# Patient Record
Sex: FEMALE | Race: WHITE | NOT HISPANIC OR LATINO | Employment: PART TIME | URBAN - METROPOLITAN AREA
[De-identification: names, ages, dates, MRNs, and addresses within clinical notes are randomized per-mention and may not be internally consistent; named-entity substitution may affect disease eponyms.]

---

## 2020-02-28 RX ORDER — LEVOTHYROXINE SODIUM 25 UG/1
TABLET ORAL
Qty: 90 TABLET | Refills: 0 | Status: SHIPPED | OUTPATIENT
Start: 2020-02-28 | End: 2020-07-24 | Stop reason: SDUPTHER

## 2020-06-09 ENCOUNTER — TELEPHONE (OUTPATIENT)
Dept: PRIMARY CARE CLINIC | Facility: CLINIC | Age: 70
End: 2020-06-09

## 2020-06-09 DIAGNOSIS — Z12.31 ENCOUNTER FOR SCREENING MAMMOGRAM FOR MALIGNANT NEOPLASM OF BREAST: Primary | ICD-10-CM

## 2020-06-09 NOTE — TELEPHONE ENCOUNTER
----- Message from Nereida Bone sent at 6/9/2020  9:01 AM CDT -----  Contact: Pt self Mobile [phone]  Patient would like a call back in regards to her wanting to speak with you about her wanting to get a mammogram done please. Patient said that she was one of your patient's but she never saw you here at [facility], patient have an appointment scheduled to see you on 07/23/2020 for a annual physical.

## 2020-06-18 ENCOUNTER — TELEPHONE (OUTPATIENT)
Dept: PRIMARY CARE CLINIC | Facility: CLINIC | Age: 70
End: 2020-06-18

## 2020-06-18 NOTE — TELEPHONE ENCOUNTER
----- Message from Elisa Lanier sent at 6/18/2020  1:30 PM CDT -----  Contact: Pt- [phone]  Type:  Patient Returning Call    Who Called: Pt    Who Left Message for Patient: Gladys Syed MA    Does the patient know what this is regarding?: yes    Would the patient rather a call back or a response via [facility]? Call    Best Call Back Number: [phone]

## 2020-06-18 NOTE — TELEPHONE ENCOUNTER
LVM requesting a call back to discuss mammogram results.    We rec pts results from [facility].   Impression: There is no mammographic evidence of malignancy. A 1 year screening mammogram is recommended.

## 2020-06-19 ENCOUNTER — PATIENT OUTREACH (OUTPATIENT)
Dept: ADMINISTRATIVE | Facility: HOSPITAL | Age: 70
End: 2020-06-19

## 2020-07-09 ENCOUNTER — TELEPHONE (OUTPATIENT)
Dept: PRIMARY CARE CLINIC | Facility: CLINIC | Age: 70
End: 2020-07-09

## 2020-07-09 NOTE — TELEPHONE ENCOUNTER
----- Message from Meenu Kalli sent at 7/9/2020 11:16 AM CDT -----  Contact: Pt [phone]  Patient is requesting to have her lab orders emailed to [email or web address]. She wants to bring the orders to have it done at [facility].    Please call and advise.    Thank You

## 2020-07-09 NOTE — TELEPHONE ENCOUNTER
I sw pt and informed her that  will order necessary labs at her visit. Pt will come in fasting and have labs done before leaving

## 2020-07-21 RX ORDER — POLYETHYLENE GLYCOL 3350, SODIUM SULFATE, SODIUM CHLORIDE, POTASSIUM CHLORIDE, ASCORBIC ACID, SODIUM ASCORBATE 140-9-5.2G
KIT ORAL DAILY
COMMUNITY
Start: 2020-07-01 | End: 2023-08-07

## 2020-07-22 RX ORDER — TACROLIMUS 1 MG/G
OINTMENT TOPICAL
COMMUNITY
Start: 2020-07-21 | End: 2023-08-07

## 2020-07-23 ENCOUNTER — LAB VISIT (OUTPATIENT)
Dept: LAB | Facility: HOSPITAL | Age: 70
End: 2020-07-23
Attending: INTERNAL MEDICINE
Payer: COMMERCIAL

## 2020-07-23 ENCOUNTER — OFFICE VISIT (OUTPATIENT)
Dept: PRIMARY CARE CLINIC | Facility: CLINIC | Age: 70
End: 2020-07-23
Payer: COMMERCIAL

## 2020-07-23 VITALS
OXYGEN SATURATION: 97 % | RESPIRATION RATE: 18 BRPM | TEMPERATURE: 97 F | WEIGHT: 211 LBS | HEART RATE: 80 BPM | HEIGHT: 68 IN | BODY MASS INDEX: 31.98 KG/M2 | DIASTOLIC BLOOD PRESSURE: 72 MMHG | SYSTOLIC BLOOD PRESSURE: 115 MMHG

## 2020-07-23 DIAGNOSIS — Z00.00 NORMAL PHYSICAL EXAM, ROUTINE: ICD-10-CM

## 2020-07-23 DIAGNOSIS — Z00.00 NORMAL PHYSICAL EXAM, ROUTINE: Primary | ICD-10-CM

## 2020-07-23 DIAGNOSIS — E55.9 VITAMIN D DEFICIENCY: ICD-10-CM

## 2020-07-23 DIAGNOSIS — E66.09 CLASS 1 OBESITY DUE TO EXCESS CALORIES WITH BODY MASS INDEX (BMI) OF 32.0 TO 32.9 IN ADULT, UNSPECIFIED WHETHER SERIOUS COMORBIDITY PRESENT: ICD-10-CM

## 2020-07-23 DIAGNOSIS — E03.9 HYPOTHYROIDISM, UNSPECIFIED TYPE: ICD-10-CM

## 2020-07-23 DIAGNOSIS — Z13.220 SCREENING FOR LIPOID DISORDERS: ICD-10-CM

## 2020-07-23 PROBLEM — E66.811 CLASS 1 OBESITY DUE TO EXCESS CALORIES WITH BODY MASS INDEX (BMI) OF 32.0 TO 32.9 IN ADULT: Status: ACTIVE | Noted: 2020-07-23

## 2020-07-23 LAB
BASOPHILS # BLD AUTO: 0.07 K/UL (ref 0–0.2)
BASOPHILS NFR BLD: 1 % (ref 0–1.9)
DIFFERENTIAL METHOD: ABNORMAL
EOSINOPHIL # BLD AUTO: 0.1 K/UL (ref 0–0.5)
EOSINOPHIL NFR BLD: 1.6 % (ref 0–8)
ERYTHROCYTE [DISTWIDTH] IN BLOOD BY AUTOMATED COUNT: 12.7 % (ref 11.5–14.5)
HCT VFR BLD AUTO: 46 % (ref 37–48.5)
HGB BLD-MCNC: 14.4 G/DL (ref 12–16)
IMM GRANULOCYTES # BLD AUTO: 0.01 K/UL (ref 0–0.04)
IMM GRANULOCYTES NFR BLD AUTO: 0.1 % (ref 0–0.5)
LYMPHOCYTES # BLD AUTO: 1.6 K/UL (ref 1–4.8)
LYMPHOCYTES NFR BLD: 23.5 % (ref 18–48)
MCH RBC QN AUTO: 30.3 PG (ref 27–31)
MCHC RBC AUTO-ENTMCNC: 31.3 G/DL (ref 32–36)
MCV RBC AUTO: 97 FL (ref 82–98)
MONOCYTES # BLD AUTO: 0.7 K/UL (ref 0.3–1)
MONOCYTES NFR BLD: 9.7 % (ref 4–15)
NEUTROPHILS # BLD AUTO: 4.5 K/UL (ref 1.8–7.7)
NEUTROPHILS NFR BLD: 64.1 % (ref 38–73)
NRBC BLD-RTO: 0 /100 WBC
PLATELET # BLD AUTO: 293 K/UL (ref 150–350)
PMV BLD AUTO: 10.7 FL (ref 9.2–12.9)
RBC # BLD AUTO: 4.76 M/UL (ref 4–5.4)
WBC # BLD AUTO: 6.98 K/UL (ref 3.9–12.7)

## 2020-07-23 PROCEDURE — 99999 PR PBB SHADOW E&M-EST. PATIENT-LVL IV: ICD-10-PCS | Mod: PBBFAC,,, | Performed by: INTERNAL MEDICINE

## 2020-07-23 PROCEDURE — 85025 COMPLETE CBC W/AUTO DIFF WBC: CPT

## 2020-07-23 PROCEDURE — 84443 ASSAY THYROID STIM HORMONE: CPT

## 2020-07-23 PROCEDURE — 84439 ASSAY OF FREE THYROXINE: CPT

## 2020-07-23 PROCEDURE — 36415 COLL VENOUS BLD VENIPUNCTURE: CPT | Mod: PN

## 2020-07-23 PROCEDURE — 99999 PR PBB SHADOW E&M-EST. PATIENT-LVL IV: CPT | Mod: PBBFAC,,, | Performed by: INTERNAL MEDICINE

## 2020-07-23 PROCEDURE — 80061 LIPID PANEL: CPT

## 2020-07-23 PROCEDURE — 80053 COMPREHEN METABOLIC PANEL: CPT

## 2020-07-23 PROCEDURE — 99397 PR PREVENTIVE VISIT,EST,65 & OVER: ICD-10-PCS | Mod: S$GLB,,, | Performed by: INTERNAL MEDICINE

## 2020-07-23 PROCEDURE — 99397 PER PM REEVAL EST PAT 65+ YR: CPT | Mod: S$GLB,,, | Performed by: INTERNAL MEDICINE

## 2020-07-23 RX ORDER — ACETAMINOPHEN 500 MG
1 TABLET ORAL DAILY
COMMUNITY
Start: 2018-09-19

## 2020-07-23 NOTE — PROGRESS NOTES
[facility] Note    Chief Complaint      Chief Complaint   Patient presents with    [facility]    Annual Exam       History of Present Illness      Karrie Ballard is a 69 y.o. WF with Hypothyroidism, Vit D def presents to re-est care with me and for well visit. Referred by nephew Dr. Job Aguirre.    Hypothyroidism - Pt on Levothyroxine 25 mcg/d    Obesity - BMI - 32.08 - Pt walking 3 miles 3 times/wk and tries to eat healthily.    HCM - Flu - '19;  Tdap - ?utd;  PCV 13 - ?;  PVX 23 - ?;  Shingrix - none - defers;  MGM - 20 - neg;  DEXA - > 3 yrs ago - defers;  PAP - at 67 y.o. - Dr. Gumaro Seals; C-scope - sched for 20;   Prev PCP - me at [facility]; GI - Dr. Eddy; Derm - Dr. Maki;  Ophtho - Dr. Pfeiffer;  Ortho - Dr. Yang/Oliver    Past Medical History:  Past Medical History:   Diagnosis Date    Cataract     Hypothyroidism     Vitamin D deficiency        Past Surgical History:   has a past surgical history that includes  section; Cataract extraction (Right); Repair of meniscus of knee (Right, 2018); Total knee arthroplasty (Right, 2019); and [place] tooth extraction.    Family History:  family history includes Aneurysm (age of onset: 77) in her father; Breast cancer (age of onset: 75) in her mother; Hearing loss in her father; Hypothyroidism in her mother; Macular degeneration in her mother; Thyroid nodules in her mother; Ulcerative colitis (age of onset: 40) in her son.     Social History:  Social History     Tobacco Use    Smoking status: Never Smoker    Smokeless tobacco: Never Used   Substance Use Topics    Alcohol use: Never     Frequency: Never    Drug use: Never       Review of Systems   Constitutional: Negative for chills, diaphoresis and fever.   HENT: Negative for congestion, hearing loss, sore throat and tinnitus.    Eyes: Negative for blurred vision and double vision.   Respiratory: Negative for cough and shortness of breath.    Cardiovascular:  Negative for chest pain and palpitations.   Gastrointestinal: Negative for abdominal pain, blood in stool, constipation, diarrhea, heartburn, melena, nausea and vomiting.   Genitourinary: Negative for dysuria and frequency.   Musculoskeletal: Negative for joint pain and myalgias.   Skin: Negative for itching and rash.   Neurological: Negative for dizziness and headaches.   Endo/Heme/Allergies: Does not bruise/bleed easily.   Psychiatric/Behavioral: Negative for depression. The patient is not nervous/anxious.         Medications:  Outpatient Encounter Medications as of 7/23/2020   Medication Sig Dispense Refill    cholecalciferol, vitamin D3, (VITAMIN D3) 50 mcg (2,000 unit) Cap Take 1 capsule by mouth once daily.       levothyroxine (SYNTHROID) 25 MCG tablet TAKE 1 TABLET BY MOUTH DAILY 90 tablet 0    naproxen sodium (ALEVE ORAL) Take by mouth as needed.       PLENVU 140-9-5.2 gram PPkS once daily.       tacrolimus (PROTOPIC) 0.1 % ointment       vit C/E/Zn/coppr/lutein/zeaxan (PRESERVISION AREDS-2 ORAL) Take by mouth 2 (two) times a day.       No facility-administered encounter medications on file as of 7/23/2020.        Current Outpatient Medications:     cholecalciferol, vitamin D3, (VITAMIN D3) 50 mcg (2,000 unit) Cap, Take 1 capsule by mouth once daily. , Disp: , Rfl:     levothyroxine (SYNTHROID) 25 MCG tablet, TAKE 1 TABLET BY MOUTH DAILY, Disp: 90 tablet, Rfl: 0    naproxen sodium (ALEVE ORAL), Take by mouth as needed. , Disp: , Rfl:     PLENVU 140-9-5.2 gram PPkS, once daily. , Disp: , Rfl:     tacrolimus (PROTOPIC) 0.1 % ointment, , Disp: , Rfl:     vit C/E/Zn/coppr/lutein/zeaxan (PRESERVISION AREDS-2 ORAL), Take by mouth 2 (two) times a day., Disp: , Rfl:     Allergies:  Review of patient's allergies indicates:   Allergen Reactions    Corticosteroids (glucocorticoids)      Other reaction(s): flushed face    Penicillins Hives       Health Maintenance:    There is no immunization history on  "file for this patient.   Health Maintenance   Topic Date Due    Hepatitis C Screening  1950    Lipid Panel  1950    TETANUS VACCINE  08/01/1970    DEXA SCAN  08/01/1992    Pneumococcal Vaccine (65+ Low/Medium Risk) (1 of 2 - PCV13) 08/01/2017    Mammogram  06/16/2022      Objective:      Vital Signs  Temp: 96.9 °F (36.1 °C)  Pulse: 80  Resp: 18  SpO2: 97 %  BP: 115/72  BP Location: Left arm  Patient Position: Sitting  Pain Score: 0-No pain  Height and Weight  Height: 5' 8" (172.7 cm)  Weight: 95.7 kg (210 lb 15.7 oz)  BSA (Calculated - sq m): 2.14 sq meters  BMI (Calculated): 32.1  Weight in (lb) to have BMI = 25: 164.1]    Laboratory:    Physical Exam  Vitals signs and nursing note reviewed.   Constitutional:       General: She is not in acute distress.     Appearance: She is well-developed. She is obese. She is not ill-appearing, toxic-appearing or diaphoretic.   HENT:      Head: Normocephalic and atraumatic.      Right Ear: External ear normal.      Left Ear: External ear normal.   Eyes:      Extraocular Movements: Extraocular movements intact.      Conjunctiva/sclera: Conjunctivae normal.      Pupils: Pupils are equal, round, and reactive to light.   Neck:      Musculoskeletal: Normal range of motion and neck supple.      Thyroid: No thyromegaly.      Vascular: No JVD.   Cardiovascular:      Rate and Rhythm: Normal rate and regular rhythm.      Heart sounds: No murmur. No friction rub. No gallop.    Pulmonary:      Effort: Pulmonary effort is normal. No respiratory distress.      Breath sounds: Normal breath sounds. No wheezing or rales.   Abdominal:      General: Bowel sounds are normal. There is no distension.      Palpations: Abdomen is soft. There is no mass.      Tenderness: There is no abdominal tenderness. There is no guarding or rebound.   Musculoskeletal: Normal range of motion.         General: No deformity.   Lymphadenopathy:      Cervical: No cervical adenopathy.   Skin:     " General: Skin is warm.      Findings: No erythema or rash.      Nails: There is no clubbing.     Neurological:      Mental Status: She is alert and oriented to person, place, and time.      Cranial Nerves: No cranial nerve deficit.   Psychiatric:         Mood and Affect: Mood normal.         Behavior: Behavior normal.             Assessment:       1. Normal physical exam, routine    2. Screening for lipoid disorders    3. Hypothyroidism, unspecified type    4. Vitamin D deficiency    5. Class 1 obesity due to excess calories with body mass index (BMI) of 32.0 to 32.9 in adult, unspecified whether serious comorbidity present        Karrie Ballard is a 69 y.o.female with:    1. Normal physical exam, routine  - CBC auto differential; Future  - Comprehensive metabolic panel; Future  - T4, free; Future  - TSH; Future  - Performed today.  Will check Basic labs.  RTC in 1 yr for fu or sooner if needed    2. Screening for lipoid disorders  - Lipid Panel; Future    3. Hypothyroidism, unspecified type  -Cont current.  Repeat TFT's.    4. Vitamin D deficiency  -Cont current.  Will get old records to review.    5. Class 1 obesity due to excess calories with body mass index (BMI) of 32.0 to 32.9 in adult, unspecified whether serious comorbidity present  -Cont to enc diet and exercise for wt loss.        Chronic conditions status updated as per HPI.  Other than changes above, cont current medications and maintain follow up with specialists.  Return to clinic 12 months for well visit or sooner if needed.      Olga Akbar MD  [facility]

## 2020-07-24 DIAGNOSIS — E03.9 HYPOTHYROIDISM, UNSPECIFIED TYPE: Primary | ICD-10-CM

## 2020-07-24 LAB
ALBUMIN SERPL BCP-MCNC: 4.4 G/DL (ref 3.5–5.2)
ALP SERPL-CCNC: 56 U/L (ref 55–135)
ALT SERPL W/O P-5'-P-CCNC: 16 U/L (ref 10–44)
ANION GAP SERPL CALC-SCNC: 12 MMOL/L (ref 8–16)
AST SERPL-CCNC: 19 U/L (ref 10–40)
BILIRUB SERPL-MCNC: 0.4 MG/DL (ref 0.1–1)
BUN SERPL-MCNC: 15 MG/DL (ref 8–23)
CALCIUM SERPL-MCNC: 9.4 MG/DL (ref 8.7–10.5)
CHLORIDE SERPL-SCNC: 107 MMOL/L (ref 95–110)
CHOLEST SERPL-MCNC: 176 MG/DL (ref 120–199)
CHOLEST/HDLC SERPL: 4.1 {RATIO} (ref 2–5)
CO2 SERPL-SCNC: 22 MMOL/L (ref 23–29)
CREAT SERPL-MCNC: 0.9 MG/DL (ref 0.5–1.4)
EST. GFR  (AFRICAN AMERICAN): >60 ML/MIN/1.73 M^2
EST. GFR  (NON AFRICAN AMERICAN): >60 ML/MIN/1.73 M^2
GLUCOSE SERPL-MCNC: 82 MG/DL (ref 70–110)
HDLC SERPL-MCNC: 43 MG/DL (ref 40–75)
HDLC SERPL: 24.4 % (ref 20–50)
LDLC SERPL CALC-MCNC: 88.8 MG/DL (ref 63–159)
NONHDLC SERPL-MCNC: 133 MG/DL
POTASSIUM SERPL-SCNC: 4.4 MMOL/L (ref 3.5–5.1)
PROT SERPL-MCNC: 7.8 G/DL (ref 6–8.4)
SODIUM SERPL-SCNC: 141 MMOL/L (ref 136–145)
T4 FREE SERPL-MCNC: 0.72 NG/DL (ref 0.71–1.51)
TRIGL SERPL-MCNC: 221 MG/DL (ref 30–150)
TSH SERPL DL<=0.005 MIU/L-ACNC: 2.26 UIU/ML (ref 0.4–4)

## 2020-07-24 RX ORDER — LEVOTHYROXINE SODIUM 25 UG/1
25 TABLET ORAL DAILY
Qty: 90 TABLET | Refills: 3 | Status: SHIPPED | OUTPATIENT
Start: 2020-07-24 | End: 2021-05-27 | Stop reason: SDUPTHER

## 2020-07-24 NOTE — PROGRESS NOTES
I sent pt a my chart message -  I reviewed your labs.  Your thyroid functions are normal.  Your Cholesterol looked good except for your Triglycerides being mildly elevated. I just recommend exercise and low chol diet.  Your kidney function and liver functions looked good.  You are not anemic. No further recommendations at this time.  I sent you refills on your thyroid meds to your pharm      Dr. TRENT

## 2020-07-31 ENCOUNTER — PATIENT OUTREACH (OUTPATIENT)
Dept: PRIMARY CARE CLINIC | Facility: CLINIC | Age: 70
End: 2020-07-31

## 2020-10-26 PROBLEM — Z00.00 NORMAL PHYSICAL EXAM, ROUTINE: Status: RESOLVED | Noted: 2020-07-23 | Resolved: 2020-10-26

## 2021-01-18 ENCOUNTER — PATIENT MESSAGE (OUTPATIENT)
Dept: PRIMARY CARE CLINIC | Facility: CLINIC | Age: 71
End: 2021-01-18

## 2021-05-27 ENCOUNTER — OFFICE VISIT (OUTPATIENT)
Dept: PRIMARY CARE CLINIC | Facility: CLINIC | Age: 71
End: 2021-05-27
Payer: COMMERCIAL

## 2021-05-27 VITALS
TEMPERATURE: 99 F | SYSTOLIC BLOOD PRESSURE: 122 MMHG | OXYGEN SATURATION: 97 % | RESPIRATION RATE: 16 BRPM | HEART RATE: 82 BPM | DIASTOLIC BLOOD PRESSURE: 80 MMHG | HEIGHT: 68 IN | BODY MASS INDEX: 32.18 KG/M2 | WEIGHT: 212.31 LBS

## 2021-05-27 DIAGNOSIS — Z01.818 PREOP EXAMINATION: Primary | ICD-10-CM

## 2021-05-27 DIAGNOSIS — E03.9 HYPOTHYROIDISM, UNSPECIFIED TYPE: ICD-10-CM

## 2021-05-27 DIAGNOSIS — Z12.31 SCREENING MAMMOGRAM, ENCOUNTER FOR: ICD-10-CM

## 2021-05-27 DIAGNOSIS — E78.1 HYPERTRIGLYCERIDEMIA: ICD-10-CM

## 2021-05-27 DIAGNOSIS — E55.9 VITAMIN D DEFICIENCY: ICD-10-CM

## 2021-05-27 PROCEDURE — 99999 PR PBB SHADOW E&M-EST. PATIENT-LVL IV: ICD-10-PCS | Mod: PBBFAC,,, | Performed by: INTERNAL MEDICINE

## 2021-05-27 PROCEDURE — 99999 PR PBB SHADOW E&M-EST. PATIENT-LVL IV: CPT | Mod: PBBFAC,,, | Performed by: INTERNAL MEDICINE

## 2021-05-27 PROCEDURE — 99214 OFFICE O/P EST MOD 30 MIN: CPT | Mod: S$GLB,,, | Performed by: INTERNAL MEDICINE

## 2021-05-27 PROCEDURE — 99214 PR OFFICE/OUTPT VISIT, EST, LEVL IV, 30-39 MIN: ICD-10-PCS | Mod: S$GLB,,, | Performed by: INTERNAL MEDICINE

## 2021-05-27 RX ORDER — LEVOTHYROXINE SODIUM 25 UG/1
25 TABLET ORAL DAILY
Qty: 90 TABLET | Refills: 0 | Status: SHIPPED | OUTPATIENT
Start: 2021-05-27 | End: 2021-10-14

## 2021-06-01 ENCOUNTER — TELEPHONE (OUTPATIENT)
Dept: PRIMARY CARE CLINIC | Facility: CLINIC | Age: 71
End: 2021-06-01

## 2021-07-07 ENCOUNTER — PATIENT MESSAGE (OUTPATIENT)
Dept: ADMINISTRATIVE | Facility: HOSPITAL | Age: 71
End: 2021-07-07

## 2021-07-21 ENCOUNTER — PATIENT MESSAGE (OUTPATIENT)
Dept: ADMINISTRATIVE | Facility: HOSPITAL | Age: 71
End: 2021-07-21

## 2021-07-21 ENCOUNTER — PATIENT OUTREACH (OUTPATIENT)
Dept: ADMINISTRATIVE | Facility: HOSPITAL | Age: 71
End: 2021-07-21

## 2021-07-23 ENCOUNTER — TELEPHONE (OUTPATIENT)
Dept: PRIMARY CARE CLINIC | Facility: CLINIC | Age: 71
End: 2021-07-23

## 2021-07-27 ENCOUNTER — LAB VISIT (OUTPATIENT)
Dept: LAB | Facility: HOSPITAL | Age: 71
End: 2021-07-27
Attending: INTERNAL MEDICINE
Payer: COMMERCIAL

## 2021-07-27 DIAGNOSIS — E78.1 HYPERTRIGLYCERIDEMIA: ICD-10-CM

## 2021-07-27 DIAGNOSIS — E03.9 HYPOTHYROIDISM, UNSPECIFIED TYPE: ICD-10-CM

## 2021-07-27 PROCEDURE — 84443 ASSAY THYROID STIM HORMONE: CPT | Performed by: INTERNAL MEDICINE

## 2021-07-27 PROCEDURE — 36415 COLL VENOUS BLD VENIPUNCTURE: CPT | Mod: PN | Performed by: INTERNAL MEDICINE

## 2021-07-27 PROCEDURE — 80061 LIPID PANEL: CPT | Performed by: INTERNAL MEDICINE

## 2021-07-28 LAB
CHOLEST SERPL-MCNC: 158 MG/DL (ref 120–199)
CHOLEST/HDLC SERPL: 3.5 {RATIO} (ref 2–5)
HDLC SERPL-MCNC: 45 MG/DL (ref 40–75)
HDLC SERPL: 28.5 % (ref 20–50)
LDLC SERPL CALC-MCNC: 76 MG/DL (ref 63–159)
NONHDLC SERPL-MCNC: 113 MG/DL
TRIGL SERPL-MCNC: 185 MG/DL (ref 30–150)
TSH SERPL DL<=0.005 MIU/L-ACNC: 3.46 UIU/ML (ref 0.4–4)

## 2022-01-12 DIAGNOSIS — E03.9 HYPOTHYROIDISM, UNSPECIFIED TYPE: ICD-10-CM

## 2022-01-13 NOTE — TELEPHONE ENCOUNTER
No new care gaps identified.  Powered by [employer]. Reference number: [id number].   1/12/2022 8:48:44 PM CST

## 2022-01-20 DIAGNOSIS — E03.9 HYPOTHYROIDISM, UNSPECIFIED TYPE: ICD-10-CM

## 2022-01-20 RX ORDER — LEVOTHYROXINE SODIUM 25 UG/1
25 TABLET ORAL DAILY
Qty: 90 TABLET | Refills: 0 | Status: SHIPPED | OUTPATIENT
Start: 2022-01-20 | End: 2022-04-16

## 2022-01-20 NOTE — TELEPHONE ENCOUNTER
----- Message from Warrennegra Atif sent at 1/20/2022 12:43 PM CST -----  Contact: self [phone]  Requesting an RX refill or new RX.  Is this a refill or new RX:new  RX name and strength (copy/paste from chart): levothyroxine (SYNTHROID) 25 MCG tablet   Is this a 30 day or 90 day RX:   Patient advised that in the future they can use their [facility] account to request a refill?: yes  Patient advised that RX refills can take up to 72 hours?yes  Pharmacy name and phone # (copy/paste from chart):    [id number] - [place], [place] - [address] AT [facility] [address]  [facility] [address]  Phone: [phone] Fax: [phone]      Comments:       Please call and advise

## 2022-01-20 NOTE — TELEPHONE ENCOUNTER
No new care gaps identified.  Powered by [employer]. Reference number: [id number].   1/20/2022 1:45:34 PM CST

## 2022-01-23 RX ORDER — LEVOTHYROXINE SODIUM 25 UG/1
TABLET ORAL
Qty: 90 TABLET | Refills: 0 | OUTPATIENT
Start: 2022-01-23

## 2022-01-23 NOTE — TELEPHONE ENCOUNTER
Quick DC. Request already responded to by other means (e.g. phone or fax)   Refill Authorization Note   Karrie Ballard  is requesting a refill authorization.  Brief Assessment and Rationale for Refill:  Quick Discontinue  Medication Therapy Plan:              Comments:   Pended Medication(s)       Requested Prescriptions     Pending Prescriptions Disp Refills    levothyroxine (SYNTHROID) 25 MCG tablet [Pharmacy Med Name: LEVOTHYROXINE 0.025MG (25MCG) TAB] 90 tablet 0     Sig: TAKE 1 TABLET(25 MCG) BY MOUTH EVERY DAY        Duplicate Pended Encounter(s)/ Last Prescribed Details: (includes pharmacy & prescriber details)   Providers    Authorizing Provider:    Karina Lyman MD   [address] [facility] [address]   Phone:  [phone]   Fax:  [phone]   MUNIRA #:  [id number]   NPI:  [id number]        Ordering User:  Karina Lyman MD          Pharmacy    [id number] [place], [place] - [facility] [address] [facility] [address]   [facility] [address] [facility] [address]   Phone:  [phone]  Fax:  [phone]   MUNIRA #:  [id number]   MARIUSZ Reason: --       Outpatient Medication Detail     Disp Refills Start End MARIUSZ   levothyroxine (SYNTHROID) 25 MCG tablet 90 tablet 0 1/20/2022  No   Sig - Route: Take 1 tablet (25 mcg total) by mouth once daily. - Oral   Sent to pharmacy as: levothyroxine (SYNTHROID) 25 MCG tablet   Class: Normal   Order: [id number]   Date/Time Signed: 1/20/2022 15:04       E-Prescribing Status: Receipt confirmed by pharmacy (1/20/2022  3:04 PM CST)     Ordering Encounter Report    Associated Reports   View Encounter                Note composed:7:45 AM 01/23/2022

## 2022-06-06 ENCOUNTER — TELEPHONE (OUTPATIENT)
Dept: PRIMARY CARE CLINIC | Facility: CLINIC | Age: 72
End: 2022-06-06
Payer: COMMERCIAL

## 2022-06-06 DIAGNOSIS — Z12.31 SCREENING MAMMOGRAM, ENCOUNTER FOR: Primary | ICD-10-CM

## 2022-06-06 NOTE — TELEPHONE ENCOUNTER
----- Message from Beatris York sent at 6/6/2022  8:53 AM CDT -----  Contact: Karrie   Karrie would like orders placed to have a MAMMO

## 2022-07-11 DIAGNOSIS — E03.9 HYPOTHYROIDISM, UNSPECIFIED TYPE: ICD-10-CM

## 2022-07-11 RX ORDER — LEVOTHYROXINE SODIUM 25 UG/1
TABLET ORAL
Qty: 90 TABLET | Refills: 0 | Status: SHIPPED | OUTPATIENT
Start: 2022-07-11 | End: 2022-10-07

## 2022-07-11 NOTE — TELEPHONE ENCOUNTER
No new care gaps identified.  [facility] Embedded Care Gaps. Reference number: [id number]. 7/11/2022   5:51:09 AM DENZELT

## 2022-07-12 NOTE — TELEPHONE ENCOUNTER
Refill Routing Note   Medication(s) are not appropriate for processing by [facility] for the following reason(s):      Drug-Disease: levothyroxine and Hypertriglyceridemia    [facility] action(s):  Defer Medication-related problems identified: Drug-disease interaction     Medication Therapy Plan: Drug-Disease: levothyroxine and Hypertriglyceridemia  Medication reconciliation completed: No     Appointments  past 12m or future 3m with PCP    Date Provider   Last Visit   5/27/2021 Olga Akbar MD   Next Visit   Visit date not found Olga Akbar MD   ED visits in past 90 days: 0        Note composed:10:28 PM 07/11/2022

## 2022-07-21 LAB — BCS RECOMMENDATION EXT: NORMAL

## 2022-07-26 ENCOUNTER — PATIENT OUTREACH (OUTPATIENT)
Dept: ADMINISTRATIVE | Facility: HOSPITAL | Age: 72
End: 2022-07-26
Payer: COMMERCIAL

## 2022-07-26 ENCOUNTER — TELEPHONE (OUTPATIENT)
Dept: PRIMARY CARE CLINIC | Facility: CLINIC | Age: 72
End: 2022-07-26
Payer: COMMERCIAL

## 2022-07-26 NOTE — PROGRESS NOTES
Patient due for the following    Hepatitis C Screening     Pneumococcal Vaccines (Age 65+) (1 - PCV)    TETANUS VACCINE     COVID-19 Vaccine (3 - Booster for Moderna series)    DEXA Scan       Received mammogram records.  Scanned to media.  updated    Immunizations: reviewed and updated  Care Everywhere: triggered  Care Teams: up to date  Outreach: none needed

## 2023-03-31 NOTE — PROGRESS NOTES
Received mammogram results.  Scanned to media.   updated.  
signs of injury. Right Ear: Tympanic membrane, ear canal and external ear normal.      Left Ear: Tympanic membrane, ear canal and external ear normal.      Mouth/Throat:      Mouth: Mucous membranes are moist.      Pharynx: Posterior oropharyngeal erythema present. No oropharyngeal exudate. Tonsils: No tonsillar exudate. Eyes:      Pupils: Pupils are equal, round, and reactive to light. Cardiovascular:      Rate and Rhythm: Normal rate and regular rhythm. Heart sounds: S1 normal and S2 normal. No murmur heard. Pulmonary:      Effort: Pulmonary effort is normal.      Breath sounds: Normal breath sounds. Abdominal:      General: Bowel sounds are normal. There is no distension. Palpations: Abdomen is soft. There is no mass. Tenderness: There is no abdominal tenderness. Musculoskeletal:         General: No deformity. Normal range of motion. Cervical back: Normal range of motion. Comments: Spine without noticeable abnormal curvature   Skin:     General: Skin is warm and dry. Coloration: Skin is not pale. Findings: No rash. Neurological:      Mental Status: She is alert. Cranial Nerves: No cranial nerve deficit. Psychiatric:         Mood and Affect: Mood normal.         Thought Content: Thought content normal.             Documentation was done using voice recognition dragon software. Efforts were made to ensure accuracy; however, inadvertent, unintentional computerized transcription errors may be present. --Sonal Giles MD on 3/31/2023    An electronic signature was used to authenticate this note.

## 2023-06-21 ENCOUNTER — TELEPHONE (OUTPATIENT)
Dept: PRIMARY CARE CLINIC | Facility: CLINIC | Age: 73
End: 2023-06-21
Payer: COMMERCIAL

## 2023-06-21 DIAGNOSIS — Z12.31 SCREENING MAMMOGRAM, ENCOUNTER FOR: Primary | ICD-10-CM

## 2023-06-21 NOTE — TELEPHONE ENCOUNTER
----- Message from Gary Buck sent at 6/21/2023  9:58 AM CDT -----  Contact: [phone]  Caller is requesting to schedule their annual screening mammogram appointment. Order is not listed in Epic.  Please enter order and contact patient to schedule.Both  Would the patient like a call back, or a response through their [facility] portal?:   call back

## 2023-07-25 LAB — BCS RECOMMENDATION EXT: NORMAL

## 2023-08-01 ENCOUNTER — TELEPHONE (OUTPATIENT)
Dept: PRIMARY CARE CLINIC | Facility: CLINIC | Age: 73
End: 2023-08-01
Payer: COMMERCIAL

## 2023-08-04 ENCOUNTER — PATIENT OUTREACH (OUTPATIENT)
Dept: ADMINISTRATIVE | Facility: HOSPITAL | Age: 73
End: 2023-08-04
Payer: MEDICARE

## 2023-08-04 NOTE — PROGRESS NOTES
Patient due for the following    Hepatitis C Screening     Pneumococcal Vaccines (Age 65+) (1 - PCV)    TETANUS VACCINE     DEXA Scan     COVID-19 Vaccine (4 - Moderna series)     Received mammogram records.  Scanned to media.  updated     Immunizations:reviewed and updated  Care Everywhere: triggered  Care Teams: up to date  Outreach: none needed

## 2023-08-07 ENCOUNTER — OFFICE VISIT (OUTPATIENT)
Dept: PRIMARY CARE CLINIC | Facility: CLINIC | Age: 73
End: 2023-08-07
Payer: MEDICARE

## 2023-08-07 VITALS
HEIGHT: 68 IN | DIASTOLIC BLOOD PRESSURE: 88 MMHG | WEIGHT: 208.13 LBS | RESPIRATION RATE: 20 BRPM | OXYGEN SATURATION: 95 % | HEART RATE: 80 BPM | BODY MASS INDEX: 31.54 KG/M2 | TEMPERATURE: 98 F | SYSTOLIC BLOOD PRESSURE: 120 MMHG

## 2023-08-07 DIAGNOSIS — E03.9 HYPOTHYROIDISM, UNSPECIFIED TYPE: Primary | ICD-10-CM

## 2023-08-07 DIAGNOSIS — E66.09 CLASS 1 OBESITY DUE TO EXCESS CALORIES WITH BODY MASS INDEX (BMI) OF 31.0 TO 31.9 IN ADULT, UNSPECIFIED WHETHER SERIOUS COMORBIDITY PRESENT: ICD-10-CM

## 2023-08-07 DIAGNOSIS — Z78.0 POSTMENOPAUSAL: ICD-10-CM

## 2023-08-07 DIAGNOSIS — E78.1 HYPERTRIGLYCERIDEMIA: ICD-10-CM

## 2023-08-07 DIAGNOSIS — Z23 NEED FOR PNEUMOCOCCAL 20-VALENT CONJUGATE VACCINATION: ICD-10-CM

## 2023-08-07 PROCEDURE — 99999PBSHW PNEUMOCOCCAL CONJUGATE VACCINE 20-VALENT: ICD-10-PCS | Mod: PBBFAC,,,

## 2023-08-07 PROCEDURE — 99214 PR OFFICE/OUTPT VISIT, EST, LEVL IV, 30-39 MIN: ICD-10-PCS | Mod: S$PBB,,, | Performed by: INTERNAL MEDICINE

## 2023-08-07 PROCEDURE — 99214 OFFICE O/P EST MOD 30 MIN: CPT | Mod: PBBFAC,PN | Performed by: INTERNAL MEDICINE

## 2023-08-07 PROCEDURE — 99214 OFFICE O/P EST MOD 30 MIN: CPT | Mod: S$PBB,,, | Performed by: INTERNAL MEDICINE

## 2023-08-07 PROCEDURE — 99999 PR PBB SHADOW E&M-EST. PATIENT-LVL IV: ICD-10-PCS | Mod: PBBFAC,,, | Performed by: INTERNAL MEDICINE

## 2023-08-07 PROCEDURE — 90677 PCV20 VACCINE IM: CPT | Mod: PBBFAC,PN

## 2023-08-07 PROCEDURE — 99999 PR PBB SHADOW E&M-EST. PATIENT-LVL IV: CPT | Mod: PBBFAC,,, | Performed by: INTERNAL MEDICINE

## 2023-08-07 PROCEDURE — 99999PBSHW PNEUMOCOCCAL CONJUGATE VACCINE 20-VALENT: Mod: PBBFAC,,,

## 2023-08-07 RX ORDER — IBUPROFEN 800 MG/1
TABLET ORAL
COMMUNITY
Start: 2023-07-20 | End: 2023-08-07

## 2023-08-07 NOTE — PROGRESS NOTES
Two patient identifiers verified.  Allergies reviewed.  Pneumonia  IM administered to left deltoid per MD order.  Patient tolerated injection well; no redness, bleeding, or bruising noted to injection site.  Patient instructed to remain in clinic setting for 15 minutes.  Verbalizes understanding.

## 2023-08-07 NOTE — PROGRESS NOTES
[facility] Note    Chief Complaint      Chief Complaint   Patient presents with    Annual Exam       History of Present Illness      Karrie Ballard is a 73 y.o. WF with Hypothyroidism, Vit D def presents to fu chronic issues. Last visit - 5/27/21. Referred by nephew Dr. Job Aguirre.    HTG -  Triglycerides were mildly elevated.   Does not require medication for this at this time. Lifestyle modification with diet and exercise can improve this number. It is actually improved from previous (185 down from 221). Repeat FLP.      Hypothyroidism - Pt on Levothyroxine 25 mcg/d.    Acute upper back pain  - Takes Ibuprofen sparingly. Fu by Ortho. S/p trigger point inj. Pain radiates to her Rt elbow. Xray wnl per pt.  Planning for Ptx starting tomorrow. D/w pt to monitor for any GI issues.      Obesity - BMI - 31.64 - Pt walks 2 miles when she can.  She tries to do a low carb diet. 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      Lab review: 5/21/21 - BUN/Cr - 15/0.8;  H/H - 14.9/44; Ucx  + K pneumoniae - she ws tx with cipro     HCM - Flu - none;  Tdap - ?utd;  PCV 20 - admin 8/7/23;  Shingrix - # 1 2/12/22; # 2 4/22/22; COVID -19 Vaccine (Moderna) - #1 - 2/6/21 and #2 - 3/5/21; #3 12/9/21; #4 ;  MGM -7/25/23 at [facility] - repeat 1yr;  DEXA - > 3 yrs ago - will order;  PAP - at 67 y.o. - Dr. Gumaro Seals; C-scope -  7/30/20- hemorrhoids and diverticulosis - repeat 5 yrs;   Prev PCP - me at [facility]; GI - Dr. Eddy; Derm - Dr. Maki;  Ophtho - Dr. Pfeiffer;  Ortho - Dr. Yang/Oliver    Patient Care Team:  Olga Akbar MD as PCP - General (Internal Medicine)  Michael Eddy MD as Consulting Physician (Gastroenterology)  Anila Pfeiffer II, MD (Ophthalmology)  Gene Boyd MD as Consulting Physician (Orthopedic Surgery)  Minda Coronado MA as Care Coordinator     Health Maintenance:  Immunization History   Administered Date(s) Administered    COVID-19, MRNA, LN-S, PF  (MODERNA FULL 0.5 ML DOSE) 2021, 2021, 2021    Influenza - High Dose - PF (65 years and older) 2017, 10/07/2018, 10/12/2021    Zoster Recombinant 2022, 2022      Health Maintenance   Topic Date Due    Hepatitis C Screening  Never done    TETANUS VACCINE  Never done    DEXA Scan  2021    Mammogram  2024    Lipid Panel  2026        Past Medical History:  Past Medical History:   Diagnosis Date    Cataract     Hypothyroidism     Vitamin D deficiency        Past Surgical History:   has a past surgical history that includes  section; Cataract extraction (Right); Repair of meniscus of knee (Right, 2018); Total knee arthroplasty (Right, 2019); [place] tooth extraction; Eye surgery (06/15); Joint replacement ( & ); and dental implant.    Family History:  family history includes Aneurysm (age of onset: 77) in her father; Breast cancer (age of onset: 75) in her mother; Hearing loss in her father; Hypothyroidism in her mother; Macular degeneration in her mother; Thyroid nodules in her mother; Ulcerative colitis (age of onset: 40) in her son.     Social History:  Social History     Tobacco Use    Smoking status: Never    Smokeless tobacco: Never   Substance Use Topics    Alcohol use: Never    Drug use: Never       Review of Systems   Constitutional:  Negative for chills, diaphoresis and fever.   HENT:  Negative for nasal congestion, hearing loss, sore throat and tinnitus.    Eyes:  Negative for visual disturbance.   Respiratory:  Negative for cough.    Cardiovascular:  Negative for chest pain, palpitations and leg swelling.   Gastrointestinal:  Negative for abdominal pain, blood in stool, constipation, diarrhea, nausea and vomiting.   Endocrine: Negative for cold intolerance and heat intolerance.   Genitourinary:  Negative for bladder incontinence, dysuria and frequency.   Musculoskeletal:  Positive for arthralgias.        Upper back/ RT shoulder  "  Neurological:  Negative for weakness and numbness.   Psychiatric/Behavioral:  Negative for dysphoric mood. The patient is not nervous/anxious.         Medications:    Current Outpatient Medications:     cholecalciferol, vitamin D3, (VITAMIN D3) 50 mcg (2,000 unit) Cap, Take 1 capsule by mouth once daily. , Disp: , Rfl:     levothyroxine (SYNTHROID) 25 MCG tablet, TAKE 1 TABLET(25 MCG) BY MOUTH EVERY DAY, Disp: 90 tablet, Rfl: 0    vit C/E/Zn/coppr/lutein/zeaxan (PRESERVISION AREDS-2 ORAL), Take by mouth 2 (two) times a day., Disp: , Rfl:      Allergies:  Review of patient's allergies indicates:   Allergen Reactions    Corticosteroids (glucocorticoids)      Other reaction(s): flushed face    Penicillins Hives       Physical Exam      Vital Signs  Temp: 98.4 °F (36.9 °C)  Pulse: 80  Resp: 20  SpO2: 95 %  BP: 120/88  BP Location: Left arm  Pain Score: 0-No pain  Height and Weight  Height: 5' 8" (172.7 cm)  Weight: 94.4 kg (208 lb 1.8 oz)  BSA (Calculated - sq m): 2.13 sq meters  BMI (Calculated): 31.7  Weight in (lb) to have BMI = 25: 164.1             Physical Exam  Vitals reviewed.   Constitutional:       General: She is not in acute distress.     Appearance: Normal appearance. She is not ill-appearing, toxic-appearing or diaphoretic.   HENT:      Head: Normocephalic and atraumatic.      Right Ear: Tympanic membrane normal.      Left Ear: Tympanic membrane normal.      Mouth/Throat:      Mouth: Mucous membranes are moist.      Pharynx: No posterior oropharyngeal erythema.   Eyes:      Comments: Left cataract   Neck:      Vascular: No carotid bruit.   Cardiovascular:      Rate and Rhythm: Normal rate and regular rhythm.      Pulses: Normal pulses.      Heart sounds: Normal heart sounds. No murmur heard.  Pulmonary:      Effort: No respiratory distress.      Breath sounds: Normal breath sounds. No wheezing.   Abdominal:      General: Bowel sounds are normal. There is no distension.      Palpations: Abdomen is soft. "      Tenderness: There is no abdominal tenderness. There is no guarding or rebound.   Musculoskeletal:         General: Normal range of motion.   Skin:     General: Skin is warm.   Neurological:      General: No focal deficit present.      Mental Status: She is alert and oriented to person, place, and time.   Psychiatric:         Mood and Affect: Mood normal.         Behavior: Behavior normal.          Laboratory:    LIPIDS:  Recent Labs   Lab 07/27/21  0705   TSH 3.460   HDL 45   Cholesterol 158   Triglycerides 185 H   LDL Cholesterol 76.0   HDL/Cholesterol Ratio 28.5   Non-HDL Cholesterol 113   Total Cholesterol/HDL Ratio 3.5       TSH:  Recent Labs   Lab 07/27/21  0705   TSH 3.460       Assessment/Plan     Karrie Ballard is a 73 y.o.female with:    Hypothyroidism, unspecified type  -     TSH; Future; Expected date: 08/07/2023  - Stable.  Cont current regimen. Repeat TSH.     Hypertriglyceridemia  -     Comprehensive Metabolic Panel; Future; Expected date: 08/07/2023  -     Lipid Panel; Future; Expected date: 08/07/2023  - Rec low carb diet and exercise. Repeat FLP.     Postmenopausal  -     DXA Bone Density Axial Skeleton 1 or more sites; Future; Expected date: 08/07/2023    Class 1 obesity due to excess calories with body mass index (BMI) of 31.0 to 31.9 in adult, unspecified whether serious comorbidity present  - Rec low carb diet and exercise. .        Chronic conditions status updated as per HPI.  Other than changes above, cont current medications and maintain follow up with specialists.   Follow up in about 1 year (around 8/7/2024) for fu chronic issues or sooner if needed.      Olga Akbar MD  [facility]

## 2023-08-08 ENCOUNTER — LAB VISIT (OUTPATIENT)
Dept: LAB | Facility: HOSPITAL | Age: 73
End: 2023-08-08
Attending: INTERNAL MEDICINE
Payer: MEDICARE

## 2023-08-08 DIAGNOSIS — E78.1 HYPERTRIGLYCERIDEMIA: ICD-10-CM

## 2023-08-08 DIAGNOSIS — E03.9 HYPOTHYROIDISM, UNSPECIFIED TYPE: ICD-10-CM

## 2023-08-08 LAB
ALBUMIN SERPL BCP-MCNC: 4.3 G/DL (ref 3.5–5.2)
ALP SERPL-CCNC: 56 U/L (ref 55–135)
ALT SERPL W/O P-5'-P-CCNC: 16 U/L (ref 10–44)
ANION GAP SERPL CALC-SCNC: 10 MMOL/L (ref 8–16)
AST SERPL-CCNC: 16 U/L (ref 10–40)
BILIRUB SERPL-MCNC: 0.5 MG/DL (ref 0.1–1)
BUN SERPL-MCNC: 16 MG/DL (ref 8–23)
CALCIUM SERPL-MCNC: 9.4 MG/DL (ref 8.7–10.5)
CHLORIDE SERPL-SCNC: 105 MMOL/L (ref 95–110)
CHOLEST SERPL-MCNC: 175 MG/DL (ref 120–199)
CHOLEST/HDLC SERPL: 3.4 {RATIO} (ref 2–5)
CO2 SERPL-SCNC: 23 MMOL/L (ref 23–29)
CREAT SERPL-MCNC: 0.8 MG/DL (ref 0.5–1.4)
EST. GFR  (NO RACE VARIABLE): >60 ML/MIN/1.73 M^2
GLUCOSE SERPL-MCNC: 74 MG/DL (ref 70–110)
HDLC SERPL-MCNC: 52 MG/DL (ref 40–75)
HDLC SERPL: 29.7 % (ref 20–50)
LDLC SERPL CALC-MCNC: 97.2 MG/DL (ref 63–159)
NONHDLC SERPL-MCNC: 123 MG/DL
POTASSIUM SERPL-SCNC: 4.1 MMOL/L (ref 3.5–5.1)
PROT SERPL-MCNC: 7.9 G/DL (ref 6–8.4)
SODIUM SERPL-SCNC: 138 MMOL/L (ref 136–145)
T4 FREE SERPL-MCNC: 0.78 NG/DL (ref 0.71–1.51)
TRIGL SERPL-MCNC: 129 MG/DL (ref 30–150)
TSH SERPL DL<=0.005 MIU/L-ACNC: 4.34 UIU/ML (ref 0.4–4)

## 2023-08-08 PROCEDURE — 36415 COLL VENOUS BLD VENIPUNCTURE: CPT | Mod: PN | Performed by: INTERNAL MEDICINE

## 2023-08-08 PROCEDURE — 80053 COMPREHEN METABOLIC PANEL: CPT | Performed by: INTERNAL MEDICINE

## 2023-08-08 PROCEDURE — 84443 ASSAY THYROID STIM HORMONE: CPT | Performed by: INTERNAL MEDICINE

## 2023-08-08 PROCEDURE — 84439 ASSAY OF FREE THYROXINE: CPT | Performed by: INTERNAL MEDICINE

## 2023-08-08 PROCEDURE — 80061 LIPID PANEL: CPT | Performed by: INTERNAL MEDICINE

## 2023-08-10 NOTE — PROGRESS NOTES
I sent pt a my chart message -  I reviewed your labs. Your TSH was slightly high at 4.34 and your Free T4 was normal.  It is still ok for age.  Do you miss any doses of your Levothyroxine and if so how often? Your Cholesterol looked good.  Your kidney function and liver functions looked good.   No further recommendations at this time.    Dr. TRENT

## 2023-10-03 DIAGNOSIS — E03.9 HYPOTHYROIDISM, UNSPECIFIED TYPE: ICD-10-CM

## 2023-10-03 RX ORDER — LEVOTHYROXINE SODIUM 25 UG/1
TABLET ORAL
Qty: 90 TABLET | Refills: 3 | Status: SHIPPED | OUTPATIENT
Start: 2023-10-03

## 2023-10-03 NOTE — TELEPHONE ENCOUNTER
Refill Decision Note   Karrie Elio  is requesting a refill authorization.  Brief Assessment and Rationale for Refill:  Approve     Medication Therapy Plan:  TSH out of range, but T4 WNL (8/8/23)      Comments:     Note composed:8:16 AM 10/03/2023

## 2023-10-03 NOTE — TELEPHONE ENCOUNTER
No care due was identified.  [facility] Embedded Care Due Messages. Reference number: [id number].   10/03/2023 3:23:17 AM CDT

## 2024-06-19 ENCOUNTER — TELEPHONE (OUTPATIENT)
Dept: PRIMARY CARE CLINIC | Facility: CLINIC | Age: 74
End: 2024-06-19
Payer: MEDICARE

## 2024-06-19 DIAGNOSIS — Z12.31 SCREENING MAMMOGRAM FOR BREAST CANCER: Primary | ICD-10-CM

## 2024-06-19 NOTE — TELEPHONE ENCOUNTER
Spoke with patient and advised that Dr. Akbar will not place fasting lab orders until she sees her for her annual.  Verified that mammogram order needs to be external.  Patient reports, yes 3D at [facility] on [facility].

## 2024-06-19 NOTE — TELEPHONE ENCOUNTER
----- Message from Vanessaalissa Dunn sent at 6/19/2024  9:49 AM CDT -----  Contact: Self [phone]  Would like to receive medical advice.    Would they like a call back or a response via [facility]:  call back    Additional information:  Calling to speak with the officer about getting orders for a mammo and labs for upcoming appt.

## 2024-07-30 LAB — BCS RECOMMENDATION EXT: NORMAL

## 2024-08-06 ENCOUNTER — PATIENT OUTREACH (OUTPATIENT)
Dept: ADMINISTRATIVE | Facility: HOSPITAL | Age: 74
End: 2024-08-06
Payer: MEDICARE

## 2024-09-29 NOTE — PROGRESS NOTES
[facility] Note    Chief Complaint      Chief Complaint   Patient presents with    Annual Exam       History of Present Illness      Karrie Ballard is a 74 y.o. WF with Hypothyroidism, Vit D def presents to fu chronic issues. Last visit - 8/7/23. Referred by nephew Dr. Job Aguirre.     HTG -  Triglycerides were mildly elevated.   Does not require medication for this at this time. Lifestyle modification with diet and exercise can improve this number. It is actually improved from previous (185 down from 221). Repeat FLP.      Hypothyroidism - Pt on Levothyroxine 25 mcg/d.  Repeat TSH.     Acute upper back pain  - Takes Ibuprofen or tylenol sparingly. Fu by Ortho. S/p trigger point inj. Pain radiates to her Rt elbow. Xray wnl per pt.  D/w pt to monitor for any GI issues. Walking helps.      Obesity - BMI - 31.91 - Pt walks 2 miles when she can.  She tries to do a low carb diet. 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 She started walking 3 times/wk    Palpable tortuous mass to RT forearm - NTTP. First noticed a couple mos ago.  No erythema. Not enlarging in size.       Lab review: 5/21/21 - BUN/Cr - 15/0.8;  H/H - 14.9/44; Ucx  + K pneumoniae - she ws tx with cipro     HCM - Flu - admin 9/30/24;  RSV - admin 9/30/24; Tdap - ?utd;  PCV 20 - v 8/7/23;  Shingrix - # 1 2/12/22; # 2 4/22/22; COVID -19 Vaccine (Moderna) - #1 - 2/6/21 and #2 - 3/5/21; #3 12/9/21; #4 ;  MGM -7/30/24 at [facility] - repeat 1yr;  DEXA - > 3 yrs ago - will order;  PAP - at 67 y.o. - Dr. Gumaro Seals; C-scope -  7/30/20- hemorrhoids and diverticulosis - repeat 5 yrs;   Prev PCP - me at [facility]; GI - Dr. Eddy; Derm - Dr. Maki; Ophtho - Dr. Pfeiffer;  Ortho - Dr. Yang/Millet       Patient Care Team:  Olga Akbar MD as PCP - General (Internal Medicine)  Michael Eddy MD as Consulting Physician (Gastroenterology)  Anila Pfeiffer II, MD (Ophthalmology)  Gene Boyd MD as Consulting Physician  (Orthopedic Surgery)     Health Maintenance:  Immunization History   Administered Date(s) Administered    COVID-19 MRNA, LN-S PF (MODERNA HALF 0.25 ML DOSE) 2021    COVID-19, MRNA, LN-S, PF (MODERNA FULL 0.5 ML DOSE) 2021, 2021    Influenza - Trivalent - Fluzone High Dose - PF (65 years and older) 2017, 10/07/2018, 10/12/2021    Pneumococcal Conjugate - 20 Valent 2023    Zoster Recombinant 2022, 2022      Health Maintenance   Topic Date Due    Hepatitis C Screening  Never done    TETANUS VACCINE  Never done    DEXA Scan  2021    Colorectal Cancer Screening  2025    Mammogram  2025    Lipid Panel  2028    Shingles Vaccine  Completed        Past Medical History:  Past Medical History:   Diagnosis Date    Cataract     Hypothyroidism     Vitamin D deficiency        Past Surgical History:   has a past surgical history that includes  section; Cataract extraction (Right); Repair of meniscus of knee (Right, 2018); Total knee arthroplasty (Right, 2019); [place] tooth extraction; Eye surgery (06/15); Joint replacement ( & ); and dental implant.    Family History:  family history includes Aneurysm (age of onset: 77) in her father; Breast cancer (age of onset: 75) in her mother; Hearing loss in her father; Hypothyroidism in her mother; Macular degeneration in her mother; Thyroid nodules in her mother; Ulcerative colitis (age of onset: 40) in her son.     Social History:  Social History     Tobacco Use    Smoking status: Never    Smokeless tobacco: Never   Substance Use Topics    Alcohol use: Never    Drug use: Never       Review of Systems   Constitutional:  Negative for chills, diaphoresis and night sweats.   HENT:  Negative for hearing loss.    Eyes:  Negative for visual disturbance.   Respiratory:  Negative for chest tightness and shortness of breath.    Cardiovascular:  Negative for chest pain and palpitations.   Gastrointestinal:  Negative  "for abdominal pain, constipation, diarrhea, nausea and vomiting.   Endocrine: Negative for cold intolerance, heat intolerance and polydipsia.   Genitourinary:  Negative for bladder incontinence, dysuria, frequency and vaginal bleeding.   Musculoskeletal:  Positive for back pain. Negative for arthralgias and myalgias.        Does not radiate down legs.  Stretching or ibuprofen/tylenol prn helps.   Neurological:  Positive for headaches. Negative for dizziness, weakness and numbness.        Frontal. Had neg CT sinuses with Dr. Guzman recently.    Psychiatric/Behavioral:  Negative for depressed mood. The patient is not nervous/anxious.         Medications:    Current Outpatient Medications:     cholecalciferol, vitamin D3, (VITAMIN D3) 50 mcg (2,000 unit) Cap, Take 1 capsule by mouth once daily. , Disp: , Rfl:     fluticasone propionate (FLONASE) 50 mcg/actuation nasal spray, 1 spray by Each Nostril route once daily., Disp: , Rfl:     levothyroxine (SYNTHROID) 25 MCG tablet, TAKE 1 TABLET(25 MCG) BY MOUTH EVERY DAY, Disp: 90 tablet, Rfl: 3    vit C/E/Zn/coppr/lutein/zeaxan (PRESERVISION AREDS-2 ORAL), Take by mouth 2 (two) times a day., Disp: , Rfl:     influenza, adjuvanted, (FLUAD TRIV 2024-25,65Y UP,,PF,) 45 mcg (15 mcg x 3)/0.5 mL IM vaccine (> or = 66 yo), Inject 0.5 mLs into the muscle once. for 1 dose, Disp: 0.5 mL, Rfl: 0    RSVPreF3 antigen-AS01E, PF, (AREXVY) 120 mcg/0.5 mL SusR vaccine, Inject 0.5mLs into the muscle., Disp: 0.5 mL, Rfl: 0     Allergies:  Review of patient's allergies indicates:   Allergen Reactions    Corticosteroids (glucocorticoids)      Other reaction(s): flushed face    Penicillins Hives       Physical Exam      Vital Signs  Temp: 97.1 °F (36.2 °C)  Temp Source: Temporal  Pulse: 76  Resp: 17  SpO2: 95 %  BP: 128/88  BP Location: Left arm  Patient Position: Sitting  Pain Score: 0-No pain  Height and Weight  Height: 5' 8" (172.7 cm)  Weight: 95.2 kg (209 lb 14.1 oz)  BSA (Calculated - sq " m): 2.14 sq meters  BMI (Calculated): 31.9  Weight in (lb) to have BMI = 25: 164.1      Patient Position: Sitting      Physical Exam  Vitals reviewed.   Constitutional:       General: She is not in acute distress.     Appearance: Normal appearance. She is not ill-appearing, toxic-appearing or diaphoretic.   HENT:      Head: Normocephalic and atraumatic.      Right Ear: Tympanic membrane normal.      Left Ear: Tympanic membrane normal.      Mouth/Throat:      Mouth: Mucous membranes are moist.   Eyes:      Extraocular Movements: Extraocular movements intact.      Conjunctiva/sclera: Conjunctivae normal.      Pupils: Pupils are equal, round, and reactive to light.   Neck:      Vascular: No carotid bruit.   Cardiovascular:      Rate and Rhythm: Normal rate and regular rhythm.      Pulses: Normal pulses.      Heart sounds: Normal heart sounds.   Pulmonary:      Effort: No respiratory distress.      Breath sounds: Normal breath sounds. No wheezing.   Abdominal:      General: Bowel sounds are normal. There is no distension.      Palpations: Abdomen is soft.      Tenderness: There is no abdominal tenderness. There is no guarding or rebound.   Musculoskeletal:         General: Normal range of motion.   Skin:     General: Skin is warm.      Comments: Right distal forearm - tortuous varicose vein - No erythema or warmth.  NTTP.  Firm.  Varicose veins to RLE   Neurological:      General: No focal deficit present.      Mental Status: She is alert and oriented to person, place, and time.   Psychiatric:         Mood and Affect: Mood normal.         Behavior: Behavior normal.          Laboratory:       CMP:  Recent Labs   Lab 08/08/23  0708   Glucose 74   Calcium 9.4   Albumin 4.3   Total Protein 7.9   Sodium 138   Potassium 4.1   CO2 23   Chloride 105   BUN 16   Creatinine 0.8   Alkaline Phosphatase 56   ALT 16   AST 16   Total Bilirubin 0.5       LIPIDS:  Recent Labs   Lab 08/08/23  0708   TSH 4.343 H   HDL 52   Cholesterol 175    Triglycerides 129   LDL Cholesterol 97.2   HDL/Cholesterol Ratio 29.7   Non-HDL Cholesterol 123   Total Cholesterol/HDL Ratio 3.4       TSH:  Recent Labs   Lab 08/08/23  0708   TSH 4.343 H       Assessment/Plan     Karrie Ballard is a 74 y.o.female with:    Hypothyroidism, unspecified type  -     TSH; Future; Expected date: 09/30/2024  - Stable.  Cont current. Repeat TSH.     Hypertriglyceridemia  -     Lipid Panel; Future; Expected date: 09/30/2024  - Stable.  Cont to monitor. Pt not on pharmacotherapy for this.     Class 1 obesity due to excess calories with body mass index (BMI) of 31.0 to 31.9 in adult, unspecified whether serious comorbidity present  - Cont to work on  diet and exercise  for wt loss.      Mass of right forearm  - Unclear etiol.  Will check U/S.     Varicose veins of right upper extremity  -     US Upper Extremity Veins Right; Future; Expected date: 09/30/2024  - Unclear etiol.  Will check U/S.     Localized swelling, mass and lump, right upper limb  -     US Upper Extremity Veins Right; Future; Expected date: 09/30/2024    Postmenopausal  -     DXA Bone Density Axial Skeleton 1 or more sites; Future; Expected date: 09/30/2024         Chronic conditions status updated as per HPI.  Other than changes above, cont current medications and maintain follow up with specialists.  Follow up in about 1 year (around 9/30/2025) for well visit or sooner if needed.      Olga Akbar MD  [facility]

## 2024-09-30 ENCOUNTER — LAB VISIT (OUTPATIENT)
Dept: LAB | Facility: HOSPITAL | Age: 74
End: 2024-09-30
Attending: INTERNAL MEDICINE
Payer: MEDICARE

## 2024-09-30 ENCOUNTER — OFFICE VISIT (OUTPATIENT)
Dept: PRIMARY CARE CLINIC | Facility: CLINIC | Age: 74
End: 2024-09-30
Payer: MEDICARE

## 2024-09-30 VITALS
BODY MASS INDEX: 31.81 KG/M2 | HEIGHT: 68 IN | DIASTOLIC BLOOD PRESSURE: 88 MMHG | TEMPERATURE: 97 F | SYSTOLIC BLOOD PRESSURE: 128 MMHG | HEART RATE: 76 BPM | WEIGHT: 209.88 LBS | RESPIRATION RATE: 17 BRPM | OXYGEN SATURATION: 95 %

## 2024-09-30 DIAGNOSIS — R22.31 LOCALIZED SWELLING, MASS AND LUMP, RIGHT UPPER LIMB: ICD-10-CM

## 2024-09-30 DIAGNOSIS — E66.09 CLASS 1 OBESITY DUE TO EXCESS CALORIES WITH BODY MASS INDEX (BMI) OF 31.0 TO 31.9 IN ADULT, UNSPECIFIED WHETHER SERIOUS COMORBIDITY PRESENT: ICD-10-CM

## 2024-09-30 DIAGNOSIS — E66.811 CLASS 1 OBESITY DUE TO EXCESS CALORIES WITH BODY MASS INDEX (BMI) OF 31.0 TO 31.9 IN ADULT, UNSPECIFIED WHETHER SERIOUS COMORBIDITY PRESENT: ICD-10-CM

## 2024-09-30 DIAGNOSIS — E03.9 HYPOTHYROIDISM, UNSPECIFIED TYPE: ICD-10-CM

## 2024-09-30 DIAGNOSIS — E03.9 HYPOTHYROIDISM, UNSPECIFIED TYPE: Primary | ICD-10-CM

## 2024-09-30 DIAGNOSIS — Z78.0 POSTMENOPAUSAL: ICD-10-CM

## 2024-09-30 DIAGNOSIS — E78.1 HYPERTRIGLYCERIDEMIA: ICD-10-CM

## 2024-09-30 DIAGNOSIS — I86.8: ICD-10-CM

## 2024-09-30 DIAGNOSIS — R22.31 MASS OF RIGHT FOREARM: ICD-10-CM

## 2024-09-30 LAB
CHOLEST SERPL-MCNC: 155 MG/DL (ref 120–199)
CHOLEST/HDLC SERPL: 4 {RATIO} (ref 2–5)
HDLC SERPL-MCNC: 39 MG/DL (ref 40–75)
HDLC SERPL: 25.2 % (ref 20–50)
LDLC SERPL CALC-MCNC: 75.6 MG/DL (ref 63–159)
NONHDLC SERPL-MCNC: 116 MG/DL
TRIGL SERPL-MCNC: 202 MG/DL (ref 30–150)
TSH SERPL DL<=0.005 MIU/L-ACNC: 2.62 UIU/ML (ref 0.4–4)

## 2024-09-30 PROCEDURE — 36415 COLL VENOUS BLD VENIPUNCTURE: CPT | Mod: PN | Performed by: INTERNAL MEDICINE

## 2024-09-30 PROCEDURE — 99999 PR PBB SHADOW E&M-EST. PATIENT-LVL IV: CPT | Mod: PBBFAC,,, | Performed by: INTERNAL MEDICINE

## 2024-09-30 PROCEDURE — 99214 OFFICE O/P EST MOD 30 MIN: CPT | Mod: PBBFAC,PN | Performed by: INTERNAL MEDICINE

## 2024-09-30 PROCEDURE — 84443 ASSAY THYROID STIM HORMONE: CPT | Performed by: INTERNAL MEDICINE

## 2024-09-30 PROCEDURE — 99214 OFFICE O/P EST MOD 30 MIN: CPT | Mod: S$PBB,,, | Performed by: INTERNAL MEDICINE

## 2024-09-30 PROCEDURE — 80061 LIPID PANEL: CPT | Performed by: INTERNAL MEDICINE

## 2024-09-30 RX ORDER — FLUTICASONE PROPIONATE 50 MCG
1 SPRAY, SUSPENSION (ML) NASAL DAILY
COMMUNITY

## 2024-10-02 DIAGNOSIS — E03.9 HYPOTHYROIDISM, UNSPECIFIED TYPE: ICD-10-CM

## 2024-10-02 RX ORDER — LEVOTHYROXINE SODIUM 25 UG/1
TABLET ORAL
Qty: 90 TABLET | Refills: 3 | Status: SHIPPED | OUTPATIENT
Start: 2024-10-02

## 2024-10-02 NOTE — TELEPHONE ENCOUNTER
Refill Decision Note   Karrie Ballard  is requesting a refill authorization.  Brief Assessment and Rationale for Refill:  Approve     Medication Therapy Plan:         Comments:     Note composed:2:14 PM 10/02/2024

## 2024-10-02 NOTE — TELEPHONE ENCOUNTER
No care due was identified.  [facility] Embedded Care Due Messages. Reference number: [id number].   10/02/2024 5:56:06 AM CDT

## 2024-10-03 ENCOUNTER — TELEPHONE (OUTPATIENT)
Dept: PRIMARY CARE CLINIC | Facility: CLINIC | Age: 74
End: 2024-10-03

## 2024-10-03 DIAGNOSIS — R22.31 LOCALIZED SWELLING, MASS AND LUMP, RIGHT UPPER LIMB: Primary | ICD-10-CM

## 2024-10-03 NOTE — TELEPHONE ENCOUNTER
I actually think it is a vein but that's fine.  We can see what nonvascular U/S shows.   Dr. TRENT

## 2024-10-03 NOTE — PROGRESS NOTES
I sent pt a my chart message -  I reviewed your labs.  Your thyroid functions are normal.  Your Cholesterol looked good in tht your bad cholesterol (LDL) was low. Your good cholesterol (HDL) was low.  I rec exercise to increase this. Your Triglycerides were mildly elevated.  We do not worry about this number unless ift is much higher. You do not require medication for this at this time.No further recommendations at this time.  Dr. TRENT

## 2024-10-03 NOTE — TELEPHONE ENCOUNTER
Spoke to Karrie with [facility]. The US order that they would need is an US extremity non-vascular.      Fax to [phone]

## 2024-10-03 NOTE — TELEPHONE ENCOUNTER
----- Message from Melina sent at 10/1/2024 12:20 PM CDT -----  Contact: [phone] Ext#[phone] @[facility]  Good afternoon [facility] would like a call back to discuss a order that was sent in for the patient that was done incorrectly. Please call them to advise [phone] Ext#[phone]

## 2024-10-11 ENCOUNTER — TELEPHONE (OUTPATIENT)
Dept: PRIMARY CARE CLINIC | Facility: CLINIC | Age: 74
End: 2024-10-11
Payer: MEDICARE

## 2024-10-11 NOTE — TELEPHONE ENCOUNTER
Telephoned patient, left message to return call----- Message from Stanley sent at 10/11/2024 12:30 PM CDT -----  Regarding: Results  Contact: Pt [phone]  [id number]    Type: Test Results    What test was performed? Bone Density, ultrasound on wrist, and labwork    Who ordered the test? Dr. Akbar    When and where were the test performed? [facility] 9/30    Would you like a call back and or thru [facility]: Call    Comments:

## 2024-10-14 ENCOUNTER — TELEPHONE (OUTPATIENT)
Dept: PRIMARY CARE CLINIC | Facility: CLINIC | Age: 74
End: 2024-10-14
Payer: MEDICARE

## 2024-10-15 ENCOUNTER — PATIENT MESSAGE (OUTPATIENT)
Dept: PRIMARY CARE CLINIC | Facility: CLINIC | Age: 74
End: 2024-10-15
Payer: MEDICARE

## 2024-10-15 NOTE — TELEPHONE ENCOUNTER
Message sent via the portal----- Message from Lise sent at 10/14/2024 10:14 AM CDT -----  Contact: [phone] Patient  [id number]    Type: Test Results    What test was performed? Bone Density Test and US of Wrist    Who ordered the test? DR Akbar    When and where were the test performed? About 2 weeks ago at [facility]    Would you like a call back and or thru [facility]: Call Back Please. Thank you.     Comments:

## 2025-02-21 DIAGNOSIS — Z00.00 ENCOUNTER FOR MEDICARE ANNUAL WELLNESS EXAM: ICD-10-CM

## 2025-08-04 LAB — CRC RECOMMENDATION EXT: NORMAL

## 2025-08-11 ENCOUNTER — TELEPHONE (OUTPATIENT)
Dept: PRIMARY CARE CLINIC | Facility: CLINIC | Age: 75
End: 2025-08-11
Payer: MEDICARE

## 2025-08-11 DIAGNOSIS — Z12.31 ENCOUNTER FOR SCREENING MAMMOGRAM FOR MALIGNANT NEOPLASM OF BREAST: Primary | ICD-10-CM

## 2025-08-14 ENCOUNTER — PATIENT OUTREACH (OUTPATIENT)
Dept: ADMINISTRATIVE | Facility: HOSPITAL | Age: 75
End: 2025-08-14
Payer: MEDICARE